# Patient Record
Sex: FEMALE | Race: WHITE | URBAN - METROPOLITAN AREA
[De-identification: names, ages, dates, MRNs, and addresses within clinical notes are randomized per-mention and may not be internally consistent; named-entity substitution may affect disease eponyms.]

---

## 2024-01-29 DIAGNOSIS — Z86.19 HISTORY OF HERPES SIMPLEX INFECTION: Primary | ICD-10-CM

## 2024-01-29 RX ORDER — VALACYCLOVIR HYDROCHLORIDE 1 G/1
TABLET, FILM COATED ORAL
Qty: 6 TABLET | Refills: 2 | Status: SHIPPED | OUTPATIENT
Start: 2024-01-29 | End: 2024-05-01 | Stop reason: ALTCHOICE

## 2024-01-29 NOTE — TELEPHONE ENCOUNTER
Pt called office for refill of Valacyclovir 1gram tablets. Pt asked if the quantity could be changed to #6, stated at times the 2 tabs BID x1 day is not quite enough, she usually needs 2 more the following morning. LV 4/2023. Pt instructed to follow up in 1 year, and appt is scheduled.

## 2024-02-22 ENCOUNTER — OFFICE VISIT (OUTPATIENT)
Dept: URGENT CARE | Facility: CLINIC | Age: 64
End: 2024-02-22
Payer: COMMERCIAL

## 2024-02-22 VITALS
WEIGHT: 135 LBS | HEART RATE: 102 BPM | OXYGEN SATURATION: 95 % | DIASTOLIC BLOOD PRESSURE: 93 MMHG | RESPIRATION RATE: 16 BRPM | TEMPERATURE: 97.6 F | SYSTOLIC BLOOD PRESSURE: 158 MMHG

## 2024-02-22 DIAGNOSIS — H10.32 ACUTE CONJUNCTIVITIS OF LEFT EYE, UNSPECIFIED ACUTE CONJUNCTIVITIS TYPE: Primary | ICD-10-CM

## 2024-02-22 DIAGNOSIS — H01.004 BLEPHARITIS OF LEFT UPPER EYELID, UNSPECIFIED TYPE: ICD-10-CM

## 2024-02-22 PROCEDURE — 99203 OFFICE O/P NEW LOW 30 MIN: CPT | Performed by: PHYSICIAN ASSISTANT

## 2024-02-22 PROCEDURE — 1036F TOBACCO NON-USER: CPT | Performed by: PHYSICIAN ASSISTANT

## 2024-02-22 RX ORDER — ERYTHROMYCIN 5 MG/G
OINTMENT OPHTHALMIC EVERY 6 HOURS
Qty: 3.5 G | Refills: 0 | Status: SHIPPED | OUTPATIENT
Start: 2024-02-22 | End: 2024-02-27

## 2024-02-22 ASSESSMENT — ENCOUNTER SYMPTOMS
EYE REDNESS: 1
EYE DISCHARGE: 1
EYE PAIN: 0
PHOTOPHOBIA: 0
EYE ITCHING: 1

## 2024-02-22 ASSESSMENT — PAIN SCALES - GENERAL: PAINLEVEL: 4

## 2024-02-22 NOTE — PROGRESS NOTES
Subjective   Patient ID: Neena Alvarado is a 63 y.o. female.    Patient is a 63-year-old female who complains of redness and irritation to her left eye that she has been experiencing for the past 2 days.  Patient denies injury or foreign body to her left eye.  Patient does wear contact lenses but states that she has been wearing her prescription eyeglasses since development of her symptoms.  Patient reports no acute changes to her vision.  Patient states she is beginning to feel irritation to her right eye, also.  Patient also has noted matting and discharge to her left eye.      Conjunctivitis  The following portions of the chart were reviewed this encounter and updated as appropriate:       Review of Systems   Eyes:  Positive for discharge, redness and itching. Negative for photophobia, pain and visual disturbance.   All other systems reviewed and are negative.  Objective   Physical Exam  Vitals and nursing note reviewed.   Constitutional:       Appearance: Normal appearance. She is normal weight.   HENT:      Head: Normocephalic and atraumatic.      Nose: Nose normal.      Mouth/Throat:      Mouth: Mucous membranes are moist.      Pharynx: Oropharynx is clear.   Eyes:      General:         Right eye: No discharge.         Left eye: Discharge present.     Extraocular Movements: Extraocular movements intact.      Pupils: Pupils are equal, round, and reactive to light.      Comments: Intense injection and erythema is noted to the left conjunctiva.  Slight matting and discharge is noted to the left eye.  There is erythema and edema noted to the left superior eyelid.  Left inferior eyelid is clear without erythema or edema.  Left periorbital skin is clear without erythema or edema.  Exam of the right conjunctiva, eyelids and periorbital skin is unremarkable.  Pupils are equal, round and reactive to light and accommodation and the patient demonstrates full extraocular range of motion bilaterally.   Cardiovascular:       Pulses: Normal pulses.   Pulmonary:      Effort: Pulmonary effort is normal.      Breath sounds: Normal breath sounds.   Skin:     General: Skin is warm and dry.      Capillary Refill: Capillary refill takes less than 2 seconds.   Neurological:      General: No focal deficit present.      Mental Status: She is alert and oriented to person, place, and time.   Psychiatric:         Mood and Affect: Mood normal.         Behavior: Behavior normal.         Thought Content: Thought content normal.         Judgment: Judgment normal.     Assessment/Plan   Physical exam findings as noted above.  Patient was provided with a prescription for erythromycin 0.5% ophthalmic ointment and instructions for application were discussed.  Patient verbalizes excellent understanding of same.    CLINICAL IMPRESSION:  Acute Conjunctivitis Left Eye;  Acute Blepharitis Left Superior Eyelid    Diagnoses and all orders for this visit:  Acute conjunctivitis of left eye, unspecified acute conjunctivitis type  -     erythromycin (Romycin) 5 mg/gram (0.5 %) ophthalmic ointment; Apply to left eye every 6 hours for 5 days. Place a 1/2 inch ribbon of ointment into the lower eyelid.  Blepharitis of left upper eyelid, unspecified type  -     erythromycin (Romycin) 5 mg/gram (0.5 %) ophthalmic ointment; Apply to left eye every 6 hours for 5 days. Place a 1/2 inch ribbon of ointment into the lower eyelid.

## 2024-03-23 ENCOUNTER — OFFICE VISIT (OUTPATIENT)
Dept: URGENT CARE | Facility: CLINIC | Age: 64
End: 2024-03-23
Payer: COMMERCIAL

## 2024-03-23 VITALS
WEIGHT: 138 LBS | SYSTOLIC BLOOD PRESSURE: 152 MMHG | TEMPERATURE: 97.8 F | DIASTOLIC BLOOD PRESSURE: 79 MMHG | RESPIRATION RATE: 16 BRPM | OXYGEN SATURATION: 95 % | HEART RATE: 94 BPM

## 2024-03-23 DIAGNOSIS — S00.412A ABRASION OF LEFT EAR CANAL, INITIAL ENCOUNTER: ICD-10-CM

## 2024-03-23 DIAGNOSIS — H66.92 LEFT OTITIS MEDIA, UNSPECIFIED OTITIS MEDIA TYPE: Primary | ICD-10-CM

## 2024-03-23 PROCEDURE — 99214 OFFICE O/P EST MOD 30 MIN: CPT | Performed by: PHYSICIAN ASSISTANT

## 2024-03-23 PROCEDURE — 1036F TOBACCO NON-USER: CPT | Performed by: PHYSICIAN ASSISTANT

## 2024-03-23 RX ORDER — AZITHROMYCIN 250 MG/1
TABLET, FILM COATED ORAL
Qty: 6 TABLET | Refills: 0 | Status: SHIPPED | OUTPATIENT
Start: 2024-03-23 | End: 2024-03-28

## 2024-03-23 ASSESSMENT — ENCOUNTER SYMPTOMS
HEMATOLOGIC/LYMPHATIC NEGATIVE: 1
SORE THROAT: 1
MUSCULOSKELETAL NEGATIVE: 1
NEUROLOGICAL NEGATIVE: 1
ENDOCRINE NEGATIVE: 1
CARDIOVASCULAR NEGATIVE: 1
GASTROINTESTINAL NEGATIVE: 1
ALLERGIC/IMMUNOLOGIC NEGATIVE: 1
EYES NEGATIVE: 1
COUGH: 1
PSYCHIATRIC NEGATIVE: 1
FEVER: 0

## 2024-03-23 ASSESSMENT — PAIN SCALES - GENERAL: PAINLEVEL: 0-NO PAIN

## 2024-03-23 NOTE — PATIENT INSTRUCTIONS
Motrin or tylenol as directed for pain or fever  Heating pad on low to outer ear may be helpful for pain  Pcp follow up this week if not improving or worsening  ER visit anytime 24/7 for acute worsening or changing condition  
No

## 2024-03-23 NOTE — PROGRESS NOTES
Subjective   Patient ID: Neena Alvarado is a 63 y.o. female.      History provided by:  Patient   used: No    Earache   Associated symptoms include coughing, ear discharge and a sore throat.     This is a 63 yr old female here for sore throat, left ear pain and bleeding from the left ear since yesterday. Cough x 3-4 weeks. No fever.     Review of Systems   Constitutional:  Negative for fever.   HENT:  Positive for ear discharge, ear pain and sore throat. Negative for congestion.    Eyes: Negative.    Respiratory:  Positive for cough.    Cardiovascular: Negative.    Gastrointestinal: Negative.    Endocrine: Negative.    Genitourinary: Negative.    Musculoskeletal: Negative.    Skin: Negative.    Allergic/Immunologic: Negative.    Neurological: Negative.    Hematological: Negative.    Psychiatric/Behavioral: Negative.     All other systems reviewed and are negative.  /79   Pulse 94   Temp 36.6 °C (97.8 °F)   Resp 16   Wt 62.6 kg (138 lb)   SpO2 95%     Objective   Physical Exam  Vitals and nursing note reviewed.   Constitutional:       Appearance: Normal appearance.   HENT:      Head: Normocephalic and atraumatic.      Right Ear: Tympanic membrane and ear canal normal.      Ears:      Comments: Left ear-TM erythematous, small excoriation left ear canal     Mouth/Throat:      Mouth: Mucous membranes are moist.      Pharynx: Oropharynx is clear.   Cardiovascular:      Rate and Rhythm: Normal rate and regular rhythm.   Pulmonary:      Effort: Pulmonary effort is normal.      Breath sounds: Normal breath sounds.   Musculoskeletal:      Cervical back: Neck supple.   Lymphadenopathy:      Cervical: No cervical adenopathy.   Skin:     General: Skin is warm and dry.   Neurological:      General: No focal deficit present.      Mental Status: She is alert and oriented to person, place, and time.   Psychiatric:         Mood and Affect: Mood normal.         Behavior: Behavior normal.      Assessment:  Left OM  Left ear canal abrasion    Plan:  Zpak as directed  Motrin or tylenol as directed for pain  Keep ear clean and dry  Pcp follow up this week if not improving or worsening  ER visit anytime 24/7 for acute worsening or changing condition

## 2024-05-01 ENCOUNTER — OFFICE VISIT (OUTPATIENT)
Dept: DERMATOLOGY | Facility: CLINIC | Age: 64
End: 2024-05-01
Payer: COMMERCIAL

## 2024-05-01 DIAGNOSIS — B00.9 HERPES SIMPLEX: ICD-10-CM

## 2024-05-01 DIAGNOSIS — Z85.828 PERSONAL HISTORY OF OTHER MALIGNANT NEOPLASM OF SKIN: Primary | ICD-10-CM

## 2024-05-01 DIAGNOSIS — L82.0 INFLAMED SEBORRHEIC KERATOSIS: ICD-10-CM

## 2024-05-01 DIAGNOSIS — L82.1 SEBORRHEIC KERATOSIS: ICD-10-CM

## 2024-05-01 DIAGNOSIS — L57.8 PHOTOAGING OF SKIN: ICD-10-CM

## 2024-05-01 DIAGNOSIS — L40.0 PSORIASIS VULGARIS: ICD-10-CM

## 2024-05-01 DIAGNOSIS — L81.4 LENTIGO: ICD-10-CM

## 2024-05-01 PROCEDURE — 99214 OFFICE O/P EST MOD 30 MIN: CPT | Performed by: DERMATOLOGY

## 2024-05-01 PROCEDURE — 17110 DESTRUCTION B9 LES UP TO 14: CPT | Performed by: DERMATOLOGY

## 2024-05-01 PROCEDURE — 1036F TOBACCO NON-USER: CPT | Performed by: DERMATOLOGY

## 2024-05-01 RX ORDER — RIFAXIMIN 550 MG/1
550 TABLET ORAL
COMMUNITY
Start: 2024-04-24 | End: 2024-05-08

## 2024-05-01 RX ORDER — TRAZODONE HYDROCHLORIDE 100 MG/1
TABLET ORAL
COMMUNITY

## 2024-05-01 RX ORDER — DICYCLOMINE HYDROCHLORIDE 10 MG/1
10 CAPSULE ORAL
COMMUNITY
Start: 2024-04-11

## 2024-05-01 RX ORDER — AMITRIPTYLINE HYDROCHLORIDE 25 MG/1
TABLET, FILM COATED ORAL
COMMUNITY
Start: 2019-03-11

## 2024-05-01 RX ORDER — BISOPROLOL FUMARATE AND HYDROCHLOROTHIAZIDE 2.5; 6.25 MG/1; MG/1
1 TABLET ORAL DAILY
COMMUNITY

## 2024-05-01 RX ORDER — CLOBETASOL PROPIONATE 0.5 MG/G
CREAM TOPICAL
Qty: 60 G | Refills: 2 | Status: SHIPPED | OUTPATIENT
Start: 2024-05-01

## 2024-05-01 RX ORDER — VALACYCLOVIR HYDROCHLORIDE 1 G/1
2000 TABLET, FILM COATED ORAL 2 TIMES DAILY
Qty: 8 TABLET | Refills: 2 | Status: SHIPPED | OUTPATIENT
Start: 2024-05-01

## 2024-05-01 ASSESSMENT — DERMATOLOGY PATIENT ASSESSMENT
ARE YOU TRYING TO GET PREGNANT: NO
HAVE YOU HAD OR DO YOU HAVE A STAPH INFECTION: NO
HAVE YOU HAD OR DO YOU HAVE VASCULAR DISEASE: NO
DO YOU HAVE ANY NEW OR CHANGING LESIONS: NO
DO YOU HAVE IRREGULAR MENSTRUAL CYCLES: NO
DO YOU USE A TANNING BED: NO
DO YOU USE SUNSCREEN: OCCASIONALLY
ARE YOU AN ORGAN TRANSPLANT RECIPIENT: NO
ARE YOU ON BIRTH CONTROL: NO

## 2024-05-01 ASSESSMENT — DERMATOLOGY QUALITY OF LIFE (QOL) ASSESSMENT
RATE HOW EMOTIONALLY BOTHERED YOU ARE BY YOUR SKIN PROBLEM (FOR EXAMPLE, WORRY, EMBARRASSMENT, FRUSTRATION): 0 - NEVER BOTHERED
RATE HOW BOTHERED YOU ARE BY EFFECTS OF YOUR SKIN PROBLEMS ON YOUR ACTIVITIES (EG, GOING OUT, ACCOMPLISHING WHAT YOU WANT, WORK ACTIVITIES OR YOUR RELATIONSHIPS WITH OTHERS): 0 - NEVER BOTHERED
ARE THERE EXCLUSIONS OR EXCEPTIONS FOR THE QUALITY OF LIFE ASSESSMENT: NO
RATE HOW BOTHERED YOU ARE BY SYMPTOMS OF YOUR SKIN PROBLEM (EG, ITCHING, STINGING BURNING, HURTING OR SKIN IRRITATION): 0 - NEVER BOTHERED
WHAT SINGLE SKIN CONDITION LISTED BELOW IS THE PATIENT ANSWERING THE QUALITY-OF-LIFE ASSESSMENT QUESTIONS ABOUT: NONE OF THE ABOVE
DATE THE QUALITY-OF-LIFE ASSESSMENT WAS COMPLETED: 66961

## 2024-05-01 ASSESSMENT — ITCH NUMERIC RATING SCALE: HOW SEVERE IS YOUR ITCHING?: 0

## 2024-05-01 ASSESSMENT — PATIENT GLOBAL ASSESSMENT (PGA): PATIENT GLOBAL ASSESSMENT: PATIENT GLOBAL ASSESSMENT:  1 - CLEAR

## 2024-05-01 NOTE — PROGRESS NOTES
"Vania Alvarado is a 63 y.o. female who presents for the following: Skin Check (Pt here for FBSE with reported hx of SCC. Pt reports no areas of concern at this time. ) and Mouth Lesions (History of cold sores, previously given valacyclovir. Nees refills.).  Lesions on the left shoulder, neck that are rough raised and rubbing.     Review of Systems:  No other skin or systemic complaints other than what is documented elsewhere in the note.    The following portions of the chart were reviewed this encounter and updated as appropriate:         Skin Cancer History  History of SCC      Specialty Problems    None       Objective   Well appearing patient in no apparent distress; mood and affect are within normal limits.    A full examination was performed including scalp, head, eyes, ears, nose, lips, neck, chest, axillae, abdomen, back, buttocks, bilateral upper extremities, bilateral lower extremities, hands, feet, fingers, toes, fingernails, and toenails. All findings within normal limits unless otherwise noted below.    Assessment/Plan   1. Personal history of other malignant neoplasm of skin  Well healed scar at site of prior treatment without evidence of recurrence.    There is no evidence of recurrence on clinical examination today, reassurance was provided to the patient. The importance of sun protection was reviewed with the patient including the use of a broad spectrum sunscreen that protects against both UVA/UVB rays, with ingredients such as Zinc oxide or titanium dioxide, wearing sun protective clothing and sun avoidance. Warning signs of non-melanoma skin cancer were reviewed. ABCDEs of melanoma reviewed. Patient to f/u should they notice any new or changing pre-existing skin lesion    Related Procedures  Follow Up In Dermatology - Established Patient    2. Inflamed seborrheic keratosis (3)  Left Parotid Area, Left Shoulder - Anterior, Neck - Anterior  pink and brown \"stuck on\" verrucous " scaly papule with surrounding erythema    The benign nature of these skin lesions reviewed, reassure provided. Due to symptomatic nature, patient offered treatment with LN2 vs. Curettage. Patient elected for LN2, The risks and benefits of LN2 were reviewed including incomplete removal, crusting, blister hypo and/or hyperpigmentation, scarring and recurrence.  See procedure note.     Destr of lesion - Left Parotid Area, Left Shoulder - Anterior, Neck - Anterior  Complexity: simple    Destruction method: cryotherapy    Informed consent: discussed and consent obtained    Lesion destroyed using liquid nitrogen: Yes    Cryotherapy cycles:  2  Outcome: patient tolerated procedure well with no complications    Post-procedure details: wound care instructions given      3. Photoaging of skin  Mottled pigmentation with telangiectasias and brown reticular macules in sun exposed areas of the body.    The risk of chronic, cumulative sun damage and risk of development of skin cancer was reviewed today.   The importance of sun protection was reviewed: including the use of a broad spectrum sunscreen that protects against both UVA/UVB rays, with ingredients such as Zinc oxide or titanium dioxide, wearing sun protective clothing and sun avoidance. We reviewed the warning signs of non-melanoma skin cancer and ABCDEs of melanoma  Please follow up should you notice any new or changing pre-existing skin lesion.    4. Herpes simplex  Left Upper Cutaneous Lip  Clear today    The nature of cold sores were reviewed with the patient today.  Cold sores are caused most commonly by the herpes simplex virus. Once exposed to the virus it stays latent in sensory nerves and they can recur.  Common triggers include stress, sunlight, certain foods.    Medications can be used to treat flares and if flares are frequent and severe can consider immunosuppressive therapy.    Continue valacyclovir 1g - 2 tabs BID x 1 day at onset of  symptoms.    valACYclovir (Valtrex) 1 gram tablet - Left Upper Cutaneous Lip  Take 2 tablets (2,000 mg) by mouth 2 times a day. For 1 day as needed at the first sign of cold sores. Save remaining tablets for future flare    5. Seborrheic keratosis  Brown, tan waxy macules and stuck on appearing papules and plaques    The benign nature of these skin lesions reviewed, reassure provided and no further treatment needed at this time.   These lesions can be removed, if symptomatic (itching, bleeding, rubbing on clothing, painful), otherwise removal is considered cosmetic.     6. Lentigo  Scattered tan macules in sun-exposed areas.    These are benign skin lesions due to sun exposure. They will darken in response to sun exposure. They should be monitored for change in size, shape or color.  These lesions can be treated cosmetically with topical creams, liquid nitrogen and a variety of lasers.    7. Psoriasis vulgaris  Left Knee - Anterior, Right Knee - Anterior  Well-demarcated erythematous papules and plaques with overlying silvery scale.    The chronic and intermittently flaring nature of this skin condition was discussed today.    The various treatment options were reviewed which include: topical steroids, which are helpful for patient.    Continue clobetasol 0.05% cream. Patient to apply 2x daily x 2 wks then decrease to 2x/day 2 days per week. Can repeat full 2 week course as often as every 6-8 weeks as needed for flaring. Side effects of topical steroids includes, but is not limited to skin atrophy and dyspigmentation.      Related Medications  clobetasol (Temovate) 0.05 % cream  Apply to affected areas 2x daily x 2 weeks then 2x daily 2-3 days/wk if needed      Follow up in 1 year

## 2025-05-01 ENCOUNTER — APPOINTMENT (OUTPATIENT)
Dept: DERMATOLOGY | Facility: CLINIC | Age: 65
End: 2025-05-01
Payer: COMMERCIAL

## 2025-05-01 DIAGNOSIS — Z85.828 PERSONAL HISTORY OF OTHER MALIGNANT NEOPLASM OF SKIN: ICD-10-CM

## 2025-05-01 DIAGNOSIS — L81.4 LENTIGO: ICD-10-CM

## 2025-05-01 DIAGNOSIS — L82.1 SEBORRHEIC KERATOSIS: ICD-10-CM

## 2025-05-01 DIAGNOSIS — Z12.83 ENCOUNTER FOR SCREENING FOR MALIGNANT NEOPLASM OF SKIN: ICD-10-CM

## 2025-05-01 DIAGNOSIS — L40.0 PSORIASIS VULGARIS: ICD-10-CM

## 2025-05-01 DIAGNOSIS — L57.8 PHOTOAGING OF SKIN: Primary | ICD-10-CM

## 2025-05-01 DIAGNOSIS — B00.9 HERPES SIMPLEX: ICD-10-CM

## 2025-05-01 PROCEDURE — 99214 OFFICE O/P EST MOD 30 MIN: CPT | Performed by: DERMATOLOGY

## 2025-05-01 RX ORDER — VALACYCLOVIR HYDROCHLORIDE 1 G/1
2000 TABLET, FILM COATED ORAL 2 TIMES DAILY
Qty: 8 TABLET | Refills: 2 | Status: SHIPPED | OUTPATIENT
Start: 2025-05-01

## 2025-05-01 NOTE — Clinical Note
The risk of chronic, cumulative sun damage and risk of development of skin cancer was reviewed today.   The importance of sun protection was reviewed: including the use of a broad spectrum sunscreen that protects against both UVA/UVB rays, with ingredients such as Zinc oxide or titanium dioxide, wearing sun protective clothing and sun avoidance. We reviewed the warning signs of non-melanoma skin cancer and ABCDEs of melanoma  Please follow up should you notice any new or changing pre-existing skin lesion.

## 2025-05-01 NOTE — PROGRESS NOTES
Subjective     Neena Alvarado is a 64 y.o. female who presents for the following: Skin Check (Annual - LV 05/01/24 - Patient has a history of: Herpes simplex (given valacyclovir previously), Psoriasis Vulgaris (uses clobetasol), SCC - 2015, [place] Area(s) of concern: red spot on left cheek that comes and goes with no symptoms and applies no topicals and has never been treatment/).  No scale noted of lesion on the right cheek.         Review of Systems:  No other skin or systemic complaints other than what is documented elsewhere in the note.    The following portions of the chart were reviewed this encounter and updated as appropriate:         Skin Cancer History  Biopsy Log Book  No skin cancers from Specimen Tracking.    Additional History  History of SCC - forearm 2013      Specialty Problems    None       Objective   Well appearing patient in no apparent distress; mood and affect are within normal limits.    A full examination was performed including scalp, head, eyes, ears, nose, lips, neck, chest, axillae, abdomen, back, buttocks, bilateral upper extremities, bilateral lower extremities, hands, feet, fingers, toes, fingernails, and toenails. All findings within normal limits unless otherwise noted below.    Assessment/Plan   Skin Exam  1. PHOTOAGING OF SKIN  Generalized  Mottled pigmentation with telangiectasias and brown reticular macules in sun exposed areas of the body.  The risk of chronic, cumulative sun damage and risk of development of skin cancer was reviewed today.   The importance of sun protection was reviewed: including the use of a broad spectrum sunscreen that protects against both UVA/UVB rays, with ingredients such as Zinc oxide or titanium dioxide, wearing sun protective clothing and sun avoidance. We reviewed the warning signs of non-melanoma skin cancer and ABCDEs of melanoma  Please follow up should you notice any new or changing pre-existing skin lesion.  Related Procedures  Follow Up In  Dermatology - Established Patient  2. PERSONAL HISTORY OF OTHER MALIGNANT NEOPLASM OF SKIN  Generalized  Well healed scar at site of prior treatment without evidence of recurrence.  There is no evidence of recurrence on clinical examination today, reassurance was provided to the patient. The importance of sun protection was reviewed with the patient including the use of a broad spectrum sunscreen that protects against both UVA/UVB rays, with ingredients such as Zinc oxide or titanium dioxide, wearing sun protective clothing and sun avoidance. Warning signs of non-melanoma skin cancer were reviewed. ABCDEs of melanoma reviewed. Patient to f/u should they notice any new or changing pre-existing skin lesion  Related Procedures  Follow Up In Dermatology - Established Patient  3. SEBORRHEIC KERATOSIS (10)  Generalized, Left Anterior Neck, Left Buccal Cheek, Left Parotid Area, Left Thigh - Anterior (3), Right Anterior Neck, Right Buccal Cheek, Right Supraclavicular Area  Brown, tan waxy macules and stuck on appearing papules and plaques  The benign nature of these skin lesions reviewed, reassure provided and no further treatment needed at this time.   These lesions can be removed, if symptomatic (itching, bleeding, rubbing on clothing, painful), otherwise removal is considered cosmetic.     LN2 as courtesy to multiple lesions. The risks and benefits of LN2 were reviewed including incomplete removal, crusting, blister hypo and/or hyperpigmentation, scarring and recurrence.    Destr of lesion - Left Anterior Neck, Left Buccal Cheek, Left Parotid Area, Left Thigh - Anterior (3), Right Anterior Neck, Right Buccal Cheek, Right Supraclavicular Area  Complexity: simple    Destruction method: cryotherapy    Informed consent: discussed and consent obtained    Lesion destroyed using liquid nitrogen: Yes    Region frozen until ice ball extended beyond lesion: Yes    Cryotherapy cycles:  1  Outcome: patient tolerated procedure well  with no complications    Post-procedure details: wound care instructions given    4. LENTIGO  Generalized  Scattered tan macules in sun-exposed areas.  These are benign skin lesions due to sun exposure. They will darken in response to sun exposure. They should be monitored for change in size, shape or color.  These lesions can be treated cosmetically with topical creams, liquid nitrogen and a variety of lasers.    Discussed fraxel for face in fall. Fraxel laser is a laser used for treatment of fine to moderate lines from aging, brown spots and some types of scarring.  Typically more than 1 treatment is needed for treatment, depending on the goal of treatment. For brown spots 1 treat may be adequate in some circumstances. For fine lines and wrinkles typically 3-4 treatments are needed.    If there is a known history of cold sores medication can be prescribed to prevent this from occurring as the procedure may cause cold sores to occur.  Topical retinoids should be avoided 1 week before and 1 week after the procedure.    Following treatment the face will be red, it will peel and will swell. The downtime of the procedure is approximately 7-10 days. It may be closer to 14 days for some patients.    Patient will consider in Fall 2025 - advised to call 1 month prior to appt to have topical anesthetic sent. Also advised will need to take valacyclovir day of procedure due to history of cold sores.  5. PSORIASIS VULGARIS  Left Knee - Anterior, Right Knee - Anterior  Pink scaly patches on knees  The chronic and intermittently flaring nature of this skin condition was discussed today.    The various treatment options were reviewed which include: topical steroids, which are helpful for patient.    Continue clobetasol 0.05% cream. Patient to apply 2x daily x 2 wks then decrease to 2x/day 2 days per week. Can repeat full 2 week course as often as every 6-8 weeks as needed for flaring. Side effects of topical steroids includes,  but is not limited to skin atrophy and dyspigmentation.    Will call for refills  Related Medications  clobetasol (Temovate) 0.05 % cream  Apply to affected areas 2x daily x 2 weeks then 2x daily 2-3 days/wk if needed  6. HERPES SIMPLEX  Left Upper Cutaneous Lip  Clear today  The nature of cold sores were reviewed with the patient today.  Cold sores are caused most commonly by the herpes simplex virus. Once exposed to the virus it stays latent in sensory nerves and they can recur.  Common triggers include stress, sunlight, certain foods.    Medications can be used to treat flares and if flares are frequent and severe can consider immunosuppressive therapy.    Continue valacyclovir 1g - 2 tabs BID x 1 day at onset of symptoms.  Related Medications  valACYclovir (Valtrex) 1 gram tablet  Take 2 tablets (2,000 mg) by mouth 2 times a day. For 1 day as needed at the first sign of cold sores. Save remaining tablets for future flare  7. ENCOUNTER FOR SCREENING FOR MALIGNANT NEOPLASM OF SKIN  Generalized  No suspicious lesions noted on examination today  The risk of chronic, cumulative sun damage and risk of development of skin cancer was reviewed today.   The importance of sun protection was reviewed: including the use of a broad spectrum sunscreen of at least SPF 30 that protects against both UVA/UVB rays, with ingredients such as Zinc oxide or titanium dioxide, wearing sun protective clothing and sun avoidance. We reviewed the warning signs of non-melanoma skin cancer and ABCDEs of melanoma  Please follow up should you notice any new or changing pre-existing skin lesion.    Follow up in 1 year for FBSE

## 2025-05-01 NOTE — Clinical Note
The benign nature of these skin lesions reviewed, reassure provided and no further treatment needed at this time.   These lesions can be removed, if symptomatic (itching, bleeding, rubbing on clothing, painful), otherwise removal is considered cosmetic.     LN2 as courtesy to multiple lesions. The risks and benefits of LN2 were reviewed including incomplete removal, crusting, blister hypo and/or hyperpigmentation, scarring and recurrence.

## 2025-05-01 NOTE — Clinical Note
The nature of cold sores were reviewed with the patient today.  Cold sores are caused most commonly by the herpes simplex virus. Once exposed to the virus it stays latent in sensory nerves and they can recur.  Common triggers include stress, sunlight, certain foods.    Medications can be used to treat flares and if flares are frequent and severe can consider immunosuppressive therapy.    Continue valacyclovir 1g - 2 tabs BID x 1 day at onset of symptoms.

## 2025-05-01 NOTE — Clinical Note
The chronic and intermittently flaring nature of this skin condition was discussed today.    The various treatment options were reviewed which include: topical steroids, which are helpful for patient.    Continue clobetasol 0.05% cream. Patient to apply 2x daily x 2 wks then decrease to 2x/day 2 days per week. Can repeat full 2 week course as often as every 6-8 weeks as needed for flaring. Side effects of topical steroids includes, but is not limited to skin atrophy and dyspigmentation.    Will call for refills

## 2025-05-01 NOTE — Clinical Note
There is no evidence of recurrence on clinical examination today, reassurance was provided to the patient. The importance of sun protection was reviewed with the patient including the use of a broad spectrum sunscreen that protects against both UVA/UVB rays, with ingredients such as Zinc oxide or titanium dioxide, wearing sun protective clothing and sun avoidance. Warning signs of non-melanoma skin cancer were reviewed. ABCDEs of melanoma reviewed. Patient to f/u should they notice any new or changing pre-existing skin lesion

## 2025-05-01 NOTE — Clinical Note
These are benign skin lesions due to sun exposure. They will darken in response to sun exposure. They should be monitored for change in size, shape or color.  These lesions can be treated cosmetically with topical creams, liquid nitrogen and a variety of lasers.    Discussed fraxel for face in fall. Fraxel laser is a laser used for treatment of fine to moderate lines from aging, brown spots and some types of scarring.  Typically more than 1 treatment is needed for treatment, depending on the goal of treatment. For brown spots 1 treat may be adequate in some circumstances. For fine lines and wrinkles typically 3-4 treatments are needed.    If there is a known history of cold sores medication can be prescribed to prevent this from occurring as the procedure may cause cold sores to occur.  Topical retinoids should be avoided 1 week before and 1 week after the procedure.    Following treatment the face will be red, it will peel and will swell. The downtime of the procedure is approximately 7-10 days. It may be closer to 14 days for some patients.    Patient will consider in Fall 2025 - advised to call 1 month prior to appt to have topical anesthetic sent. Also advised will need to take valacyclovir day of procedure due to history of cold sores.

## 2025-11-06 ENCOUNTER — APPOINTMENT (OUTPATIENT)
Dept: DERMATOLOGY | Facility: CLINIC | Age: 65
End: 2025-11-06

## 2026-05-14 ENCOUNTER — APPOINTMENT (OUTPATIENT)
Dept: DERMATOLOGY | Facility: CLINIC | Age: 66
End: 2026-05-14
Payer: COMMERCIAL